# Patient Record
Sex: FEMALE | Race: WHITE | NOT HISPANIC OR LATINO
[De-identification: names, ages, dates, MRNs, and addresses within clinical notes are randomized per-mention and may not be internally consistent; named-entity substitution may affect disease eponyms.]

---

## 2023-10-10 ENCOUNTER — NON-APPOINTMENT (OUTPATIENT)
Age: 73
End: 2023-10-10

## 2023-10-10 PROBLEM — Z00.00 ENCOUNTER FOR PREVENTIVE HEALTH EXAMINATION: Status: ACTIVE | Noted: 2023-10-10

## 2023-10-12 ENCOUNTER — APPOINTMENT (OUTPATIENT)
Dept: BREAST CENTER | Facility: CLINIC | Age: 73
End: 2023-10-12
Payer: MEDICARE

## 2023-10-12 VITALS
DIASTOLIC BLOOD PRESSURE: 74 MMHG | BODY MASS INDEX: 30.32 KG/M2 | SYSTOLIC BLOOD PRESSURE: 156 MMHG | HEIGHT: 65 IN | HEART RATE: 73 BPM | WEIGHT: 182 LBS

## 2023-10-12 DIAGNOSIS — Z80.41 FAMILY HISTORY OF MALIGNANT NEOPLASM OF OVARY: ICD-10-CM

## 2023-10-12 DIAGNOSIS — F17.200 NICOTINE DEPENDENCE, UNSPECIFIED, UNCOMPLICATED: ICD-10-CM

## 2023-10-12 DIAGNOSIS — Z78.9 OTHER SPECIFIED HEALTH STATUS: ICD-10-CM

## 2023-10-12 PROCEDURE — 99204 OFFICE O/P NEW MOD 45 MIN: CPT

## 2023-10-12 RX ORDER — ATORVASTATIN CALCIUM 80 MG/1
TABLET, FILM COATED ORAL
Refills: 0 | Status: ACTIVE | COMMUNITY

## 2023-10-27 ENCOUNTER — OUTPATIENT (OUTPATIENT)
Dept: OUTPATIENT SERVICES | Facility: HOSPITAL | Age: 73
LOS: 1 days | End: 2023-10-27
Payer: MEDICARE

## 2023-10-27 ENCOUNTER — RESULT REVIEW (OUTPATIENT)
Age: 73
End: 2023-10-27

## 2023-10-27 DIAGNOSIS — N60.91 UNSPECIFIED BENIGN MAMMARY DYSPLASIA OF RIGHT BREAST: ICD-10-CM

## 2023-10-27 PROCEDURE — 88321 CONSLTJ&REPRT SLD PREP ELSWR: CPT

## 2023-10-30 LAB
SURGICAL PATHOLOGY STUDY: SIGNIFICANT CHANGE UP

## 2023-11-02 ENCOUNTER — NON-APPOINTMENT (OUTPATIENT)
Age: 73
End: 2023-11-02

## 2023-11-07 ENCOUNTER — NON-APPOINTMENT (OUTPATIENT)
Age: 73
End: 2023-11-07

## 2023-11-08 ENCOUNTER — NON-APPOINTMENT (OUTPATIENT)
Age: 73
End: 2023-11-08

## 2023-11-15 ENCOUNTER — APPOINTMENT (OUTPATIENT)
Age: 73
End: 2023-11-15

## 2023-11-30 ENCOUNTER — APPOINTMENT (OUTPATIENT)
Dept: BREAST CENTER | Facility: CLINIC | Age: 73
End: 2023-11-30

## 2023-12-04 ENCOUNTER — NON-APPOINTMENT (OUTPATIENT)
Age: 73
End: 2023-12-04

## 2023-12-05 ENCOUNTER — NON-APPOINTMENT (OUTPATIENT)
Age: 73
End: 2023-12-05

## 2023-12-11 ENCOUNTER — NON-APPOINTMENT (OUTPATIENT)
Age: 73
End: 2023-12-11

## 2023-12-11 ENCOUNTER — RESULT REVIEW (OUTPATIENT)
Age: 73
End: 2023-12-11

## 2023-12-19 ENCOUNTER — NON-APPOINTMENT (OUTPATIENT)
Age: 73
End: 2023-12-19

## 2023-12-20 ENCOUNTER — NON-APPOINTMENT (OUTPATIENT)
Age: 73
End: 2023-12-20

## 2024-01-18 ENCOUNTER — NON-APPOINTMENT (OUTPATIENT)
Age: 74
End: 2024-01-18

## 2024-01-22 ENCOUNTER — APPOINTMENT (OUTPATIENT)
Dept: BREAST CENTER | Facility: CLINIC | Age: 74
End: 2024-01-22

## 2024-01-31 ENCOUNTER — RESULT REVIEW (OUTPATIENT)
Age: 74
End: 2024-01-31

## 2024-02-02 ENCOUNTER — NON-APPOINTMENT (OUTPATIENT)
Age: 74
End: 2024-02-02

## 2024-02-05 ENCOUNTER — APPOINTMENT (OUTPATIENT)
Dept: BREAST CENTER | Facility: CLINIC | Age: 74
End: 2024-02-05
Payer: MEDICARE

## 2024-02-05 ENCOUNTER — RESULT REVIEW (OUTPATIENT)
Age: 74
End: 2024-02-05

## 2024-02-05 VITALS
HEART RATE: 64 BPM | BODY MASS INDEX: 30.66 KG/M2 | HEIGHT: 65 IN | WEIGHT: 184 LBS | SYSTOLIC BLOOD PRESSURE: 158 MMHG | DIASTOLIC BLOOD PRESSURE: 72 MMHG

## 2024-02-05 DIAGNOSIS — R92.8 OTHER ABNORMAL AND INCONCLUSIVE FINDINGS ON DIAGNOSTIC IMAGING OF BREAST: ICD-10-CM

## 2024-02-05 PROCEDURE — 99215 OFFICE O/P EST HI 40 MIN: CPT

## 2024-02-05 NOTE — PAST MEDICAL HISTORY
[Surgical Menopause] : The patient is in surgical menopause [Total Preg ___] : G[unfilled] [Live Births ___] : P[unfilled]  [Age At Live Birth ___] : Age at live birth: [unfilled] [History of Hormone Replacement Treatment] : has no history of hormone replacement treatment [de-identified] : Hysterectomy [FreeTextEntry6] : No [FreeTextEntry7] : No [FreeTextEntry8] : No

## 2024-02-05 NOTE — PHYSICAL EXAM
[Normocephalic] : normocephalic [EOMI] : extra ocular movement intact [Supple] : supple [Examined in the supine and seated position] : examined in the supine and seated position [No dominant masses] : no dominant masses in right breast  [No dominant masses] : no dominant masses left breast [No Nipple Retraction] : no left nipple retraction [No Nipple Discharge] : no left nipple discharge [No Axillary Lymphadenopathy] : no left axillary lymphadenopathy

## 2024-02-05 NOTE — PLAN
[TextEntry] : Patient to go to the OR on 3/13/24 for right breast needle localization partial mastectomy and sentinel lymph node biopsy Consent signed and scanned to chart Medical clearance needed  Patient to follow-up 7 to 10 days after surgery completed

## 2024-02-05 NOTE — DATA REVIEWED
[FreeTextEntry1] : 10/3/22 (R) screening mammogram: scattered fbg density: Questioned findings in the right breast. Recommend patient submit her outside studies for comparison. Otherwise additional right diagnostic mammography and ultrasound are recommended. BI-RADS 0.  1/23/23 (R) right diag mammo and US: scattered fbg density: 1. Indeterminate right breast calcifications. Stereotactic guided biopsy is recommended. 2. Probably benign right breast nodules. Six-month follow-up targeted right breast ultrasound is recommended. BI-RADS 4.  2/6/23 (R) THE STEREOTACTIC BIOPSY WAS CANCELED. IMPRESSION: "Probably benign" microcalcifications in the upper outer right breast. The stereotactic biopsy was canceled. The patient will try to obtain her prior studies. A follow-up right breast magnification recommended is recommended in 6 months.  8/21/23 (R) right diag mammo & US: scattered fbg density. 1. Right breast, 1:00, 14-20 cm from the nipple (measurement varies with positioning), palpable 1.1 cm solid mass for which ultrasound-guided core biopsies recommended. 2. Probably benign right breast upper-outer quadrant focal asymmetry with stable calcifications and upper inner quadrant focal asymmetry for which diagnostic mammography and targeted sonography, October, 2023 recommended to coincide with patient's annual exam, pending right breast biopsy pathology. BI-RADS 4.  9/25/23 ([facility]) right US bx: Right breast 1 o'clock axis, 20 cm from the nipple. Fragments of a papillary lesion with atypia. A focus of atypical ductal hyperplasia with calcifications. Differential includes papillary carcinoma, atypical intraductal papillary lesion. Excision of the lesion is recommended. High risk and concordant pathology.  12/11/2023 (R) right diagnostic mammogram: Biopsy-proven papilloma containing DCIS and microinvasion is again seen containing a biopsy clip in the upper right breast. In the central right breast there is a circumscribed mass measuring 0.9 cm which corresponds to a mass seen on the previous mammogram in the outer portion of the right breast and has increased in size in comparison to earlier mammograms, this area will be reevaluated under ultrasound for a correlate. A question calcifications is not definitively seen on these views. BI-RADS 0  12/11/2023 (R) right breast ultrasound biopsy 10:00 5 cm FN (wing clip): Benign breast parenchyma with a portion of the cyst wall, pathology is benign, concordant. A 6-month follow-up right targeted US is recommended. Patient is currently scheduled for stereotactic biopsy of indeterminate calcifications in the right breast 12 cm FN described on mammogram on 11/7/2023.  1/12/2024 ([facility]) right breast stereotactic biopsy (clip): - Fibroadenomatoid changes associated with calcifications.- Multiple levels examined, concordance pending

## 2024-02-05 NOTE — ASSESSMENT
[FreeTextEntry1] : 73-year-old female with right breast atypical ductal hyperplasia and papillary lesion suspicious for possible papillary carcinoma with microinvasion

## 2024-02-05 NOTE — FAMILY HISTORY
[TextEntry] : Mother ovarian cancer age 35 Brother colon cancer age 62. Denies any family history of breast, pancreatic, prostate or melanoma.

## 2024-03-12 ENCOUNTER — OUTPATIENT (OUTPATIENT)
Dept: OUTPATIENT SERVICES | Facility: HOSPITAL | Age: 74
LOS: 1 days | End: 2024-03-12
Payer: MEDICARE

## 2024-03-12 ENCOUNTER — APPOINTMENT (OUTPATIENT)
Dept: NUCLEAR MEDICINE | Facility: HOSPITAL | Age: 74
End: 2024-03-12
Payer: MEDICARE

## 2024-03-12 PROCEDURE — 78195 LYMPH SYSTEM IMAGING: CPT | Mod: 26

## 2024-03-12 PROCEDURE — 78195 LYMPH SYSTEM IMAGING: CPT

## 2024-03-12 PROCEDURE — A9541: CPT

## 2024-03-13 ENCOUNTER — TRANSCRIPTION ENCOUNTER (OUTPATIENT)
Age: 74
End: 2024-03-13

## 2024-03-13 ENCOUNTER — APPOINTMENT (OUTPATIENT)
Age: 74
End: 2024-03-13
Payer: MEDICARE

## 2024-03-13 ENCOUNTER — OUTPATIENT (OUTPATIENT)
Dept: OUTPATIENT SERVICES | Facility: HOSPITAL | Age: 74
LOS: 1 days | End: 2024-03-13

## 2024-03-13 ENCOUNTER — APPOINTMENT (OUTPATIENT)
Dept: MAMMOGRAPHY | Facility: CLINIC | Age: 74
End: 2024-03-13
Payer: MEDICARE

## 2024-03-13 ENCOUNTER — RESULT REVIEW (OUTPATIENT)
Age: 74
End: 2024-03-13

## 2024-03-13 ENCOUNTER — APPOINTMENT (OUTPATIENT)
Dept: ULTRASOUND IMAGING | Facility: CLINIC | Age: 74
End: 2024-03-13
Payer: MEDICARE

## 2024-03-13 ENCOUNTER — NON-APPOINTMENT (OUTPATIENT)
Age: 74
End: 2024-03-13

## 2024-03-13 PROCEDURE — 38900 IO MAP OF SENT LYMPH NODE: CPT | Mod: RT

## 2024-03-13 PROCEDURE — 14000 TIS TRNFR TRUNK 10 SQ CM/<: CPT | Mod: RT,59

## 2024-03-13 PROCEDURE — 88360 TUMOR IMMUNOHISTOCHEM/MANUAL: CPT | Mod: 26

## 2024-03-13 PROCEDURE — 88305 TISSUE EXAM BY PATHOLOGIST: CPT | Mod: 26,59

## 2024-03-13 PROCEDURE — 88307 TISSUE EXAM BY PATHOLOGIST: CPT | Mod: 26,59

## 2024-03-13 PROCEDURE — 76098 X-RAY EXAM SURGICAL SPECIMEN: CPT | Mod: 26,77

## 2024-03-13 PROCEDURE — 76098 X-RAY EXAM SURGICAL SPECIMEN: CPT | Mod: 26

## 2024-03-13 PROCEDURE — 19301 PARTIAL MASTECTOMY: CPT | Mod: RT

## 2024-03-13 PROCEDURE — 38525 BIOPSY/REMOVAL LYMPH NODES: CPT | Mod: RT

## 2024-03-13 PROCEDURE — 88307 TISSUE EXAM BY PATHOLOGIST: CPT | Mod: 26

## 2024-03-13 PROCEDURE — 19285 PERQ DEV BREAST 1ST US IMAG: CPT | Mod: RT

## 2024-03-13 PROCEDURE — 88305 TISSUE EXAM BY PATHOLOGIST: CPT | Mod: 26

## 2024-03-15 ENCOUNTER — NON-APPOINTMENT (OUTPATIENT)
Age: 74
End: 2024-03-15

## 2024-03-19 ENCOUNTER — NON-APPOINTMENT (OUTPATIENT)
Age: 74
End: 2024-03-19

## 2024-03-20 ENCOUNTER — NON-APPOINTMENT (OUTPATIENT)
Age: 74
End: 2024-03-20

## 2024-03-20 LAB — SURGICAL PATHOLOGY STUDY: SIGNIFICANT CHANGE UP

## 2024-03-22 ENCOUNTER — NON-APPOINTMENT (OUTPATIENT)
Age: 74
End: 2024-03-22

## 2024-03-25 ENCOUNTER — APPOINTMENT (OUTPATIENT)
Dept: BREAST CENTER | Facility: CLINIC | Age: 74
End: 2024-03-25
Payer: MEDICARE

## 2024-03-25 ENCOUNTER — APPOINTMENT (OUTPATIENT)
Dept: HEMATOLOGY ONCOLOGY | Facility: CLINIC | Age: 74
End: 2024-03-25

## 2024-03-25 VITALS
SYSTOLIC BLOOD PRESSURE: 125 MMHG | HEART RATE: 71 BPM | BODY MASS INDEX: 29.66 KG/M2 | HEIGHT: 65 IN | WEIGHT: 178 LBS | DIASTOLIC BLOOD PRESSURE: 67 MMHG

## 2024-03-25 DIAGNOSIS — N60.91 UNSPECIFIED BENIGN MAMMARY DYSPLASIA OF RIGHT BREAST: ICD-10-CM

## 2024-03-25 PROCEDURE — 99024 POSTOP FOLLOW-UP VISIT: CPT

## 2024-03-25 NOTE — DATA REVIEWED
[FreeTextEntry1] : 10/3/22 (R) screening mammogram: scattered fbg density: Questioned findings in the right breast. Recommend patient submit her outside studies for comparison. Otherwise additional right diagnostic mammography and ultrasound are recommended. BI-RADS 0.  1/23/23 (R) right diag mammo and US: scattered fbg density: 1. Indeterminate right breast calcifications. Stereotactic guided biopsy is recommended. 2. Probably benign right breast nodules. Six-month follow-up targeted right breast ultrasound is recommended. BI-RADS 4.  2/6/23 (R) THE STEREOTACTIC BIOPSY WAS CANCELED. IMPRESSION: "Probably benign" microcalcifications in the upper outer right breast. The stereotactic biopsy was canceled. The patient will try to obtain her prior studies. A follow-up right breast magnification recommended is recommended in 6 months.  8/21/23 (R) right diag mammo & US: scattered fbg density. 1. Right breast, 1:00, 14-20 cm from the nipple (measurement varies with positioning), palpable 1.1 cm solid mass for which ultrasound-guided core biopsies recommended. 2. Probably benign right breast upper-outer quadrant focal asymmetry with stable calcifications and upper inner quadrant focal asymmetry for which diagnostic mammography and targeted sonography, October, 2023 recommended to coincide with patient's annual exam, pending right breast biopsy pathology. BI-RADS 4.  9/25/23 ([facility]) right US bx: Right breast 1 o'clock axis, 20 cm from the nipple. Fragments of a papillary lesion with atypia. A focus of atypical ductal hyperplasia with calcifications. Differential includes papillary carcinoma, atypical intraductal papillary lesion. Excision of the lesion is recommended. High risk and concordant pathology.  12/11/2023 (R) right diagnostic mammogram: Biopsy-proven papilloma containing DCIS and microinvasion is again seen containing a biopsy clip in the upper right breast. In the central right breast there is a circumscribed mass measuring 0.9 cm which corresponds to a mass seen on the previous mammogram in the outer portion of the right breast and has increased in size in comparison to earlier mammograms, this area will be reevaluated under ultrasound for a correlate. A question calcifications is not definitively seen on these views. BI-RADS 0  12/11/2023 (R) right breast ultrasound biopsy 10:00 5 cm FN (wing clip): Benign breast parenchyma with a portion of the cyst wall, pathology is benign, concordant. A 6-month follow-up right targeted US is recommended. Patient is currently scheduled for stereotactic biopsy of indeterminate calcifications in the right breast 12 cm FN described on mammogram on 11/7/2023.  1/12/2024 ([facility]) right breast stereotactic biopsy (clip): - Fibroadenomatoid changes associated with calcifications.- Multiple levels examined, concordance pending   3/13/2024 (L  pathology) right breast partial mastectomy, right SLNB: 1  Right breast 1 oclock lumpectomy: Ductal carcinoma in situ, intermediate grade, solid and cribriform types, involving intraductal papilloma, present on 5contiguous sections (20 mm), present at the medial margin and biopsy site changes. Fibrocystic changes associated calcifications. 2  Right breast additional anterior margin: Benign fibroadipose tissue and skeletal muscle, negative for tumor. 3  Right breast additional posterior margin: Benign fibroadipose tissue and skeletal muscle, negative for tumor. 4  Right breast additional inferior margin:  Lobular carcinoma in situ. Intraductal papilloma. Lobular carcinoma in situ.  Fibrocystic changes. 5  Right breast additional superior margin: Benign breast tissue and skeletal muscle. 6  Right breast additional lateral margin:  Benign fibrofatty breast, negative for tumor. 7  Right breast additional medial margin:  Focal atypical duct hyperplasia associated calcifications.  Lobular carcinoma in situ.  Intraductal papilloma. Fibrocystic changes. Benign skeletal muscle. 8  Right axillary sentinel lymph nodes: One lymph node, negative for tumor (0/1).

## 2024-03-25 NOTE — HISTORY OF PRESENT ILLNESS
[FreeTextEntry1] : 72 yo female presents for postoperative evaluation of right 1:00 20cmFN DCIS with a focus of microinvasion (IHC markers unknown) status post right breast localized partial mastectomy, right SLNB on 3/13/2024, final surgical pathology revealed DCIS measuring 2.0 cm, LCIS, focal ADH, 0/1 lymph node negative for tumor.  TNM stage: pTispN0.  Patient is currently complaining of 10/10 pain.  On physical exam, the patient was noted to have a significant right axillary seroma.  The seroma was drained in the office and the patient tolerated it well. Surgical pathology returned as seen below. I explained the results to the patient and provided them with a copy of the report.  She understands that she will have to follow-up with medical oncology as well as radiation oncology for discussion of adjuvant treatment.  Genetic counseling was performed today and the patient declined testing at this time.  The patient was given a cancer care package.  Cancer history: Patient was diagnosed with right microinvasive DCIS in 2023. 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 Patient is status post right breast localized partial mastectomy, final surgical pathology revealed DCIS measuring 2.0 cm, LCIS, focal ADH, 0/1 lymph node negative for tumor.  TNM stage: pTispN0  Prior to patient's diagnosis, her VERENICE lifetime risk was 20.7%; family history of ovarian cancer mother diagnosed at age 35, denies family history of breast cancer.  Patient is a current smoker, states she smokes about 4 cigarettes daily.

## 2024-03-25 NOTE — ASSESSMENT
[FreeTextEntry1] : 73-year-old female status post right partial mastectomy and sentinel lymph node biopsy on 3/13/2024 for DCIS, LCIS, focal ADH

## 2024-03-25 NOTE — DISCUSSION/SUMMARY
[FreeTextEntry1] : REASON FOR CONSULT Emily Romero is a 73-year-old female who was referred by Dr. Sabiha Carter for cancer genetic counseling and risk assessment due to a recent breast cancer diagnosis.  RELEVANT MEDICAL HISTORY Ms. Romero was diagnosed with right breast cancer in  at 73 years old. Review of outside pathology report of right breast biopsy in 2023 revealed ductal carcinoma in situ. Outside pathology report stated fragments of a papillary lesion with atypia and a focus of atypical ductal hyperplasia with calcifications. Surgical pathology report from right lumpectomy revealed right ductal carcinoma in situ, unknown biomarkers, and right focal atypical ductal hyperplasia associated with calcifications, lobular carcinoma in situ, intraductal papilloma, fibrocystic changes associated with calcifications, benign fibrofatty breast tissue, and benign fibroadipose tissue. She was treated with a right lumpectomy.   OTHER MEDICAL AND SURGICAL HISTORY: -	Medical History: asthma -	Surgical History: hysterectomy and bilateral salpingo-oophorectomy (45 years old; due to fibroids), hernia repair, right lumpectomy  PAST OB/GYN HISTORY: Obstetrical History:  Age at Menarche: 16 Menopausal with LMP at age 45  Age at First Live Birth: 18 Oral Contraceptive Use: No Hormone Replacement Therapy: No  CANCER SCREENING HISTORY:   Breast:  -	Mammography: 2023-Bilateral, predominant circumscribed mass with coarse calcifications and a lobulated mass previously described as an asymmetry were identified in the right breast as well as a circumscribed, lobulated mass in the left breast. Known biopsy-proven papillary lesion with atypia was identified in the right breast without significant change. Addendum to report issued after review of outside pathology and upgrade to DCIS stating right stereotactic and ultrasound-guided biopsies were recommended. 2023-Right, biopsy-proven DCIS was identified as well as a circumscribed mass which has increased in size in the right breast. Ultrasound was recommended. -	Sonography: 2023-Bilateral, known papillary lesion with atypia was identified without significant changes in the right breast as well as a circumscribed mass in the right breast. A circumscribed, lobulated mass was identified in the left breast. Addendum to report issued after review of outside pathology and upgrade to DCIS stating right stereotactic and ultrasound-guided biopsies were recommended.  -	MRI: No -	Biopsies: 2024-Right, fibroadenomatoid changes associated with calcifications; 2023-Right, benign breast parenchyma with a portion of cyst wall; 10/2023-Right, DCIS, focus suspicious for microinvasion; 2023-Right, outside report revealed fragments of a papillary lesion with atypia, focus of ADH with calcifications  GYN: -	Pelvic Examination: last , reportedly normal  -	Sonography: No -	CA-125: No Colon: -	Colonoscopy: last 7 years ago, reportedly normal. Patient reports approximately 10 colorectal polyps identified on previous colonoscopies. -	Upper Endoscopy: last 7 years ago, reportedly normal  Skin:   -	FBSE: No -	Lesions biopsied/removed: No  SOCIAL HISTORY: -	Tobacco-product use: Yes, current (4 cigarettes/day)  FAMILY HISTORY: Maternal ancestry and paternal ancestry were reported as [place]. Ashkenazi Religion ancestry and consanguinity were denied. A detailed family history of cancer was ascertained. Relevant diagnoses are detailed below and in the scanned pedigree.   To Ms. Romero's knowledge, no one in the family has had germline testing for cancer susceptibility.   	 	RISK ASSESSMENT: Ms. Romero's personal history of breast cancer and colorectal polyps and/or family history of ovarian cancer is suggestive of an inherited predisposition to breast cancer and/or ovarian cancer and/or colorectal polyps and related cancers. We recommended genetic testing for genes associated with breast cancer, gynecological cancers, and colorectal cancer.  We discussed the risks, benefits and limitations, and implications of genetic testing. We also discussed the psychosocial implications of genetic testing. Possible test results were reviewed with Ms. Romero, along with associated medical management options.   Of note, Ms. Romero declined genetic testing today and stated that she is not interested in genetic testing at this time. She was made aware that we remain available to her should she have additional questions or would like to proceed with genetic testing in the future.   PLAN:  1.	Ms. Romero declined genetic testing today. She will recontact Cancer Genetics if she has additional questions or would like to pursue genetic testing in the future.   For any additional questions please call [facility] at [phone].    Norma Coffman MS, [facility] Genetic Counselor, [facility]   CC:  Dr. Sabiha Carter

## 2024-03-25 NOTE — PHYSICAL EXAM
[Normocephalic] : normocephalic [EOMI] : extra ocular movement intact [Examined in the supine and seated position] : examined in the supine and seated position [No dominant masses] : no dominant masses in right breast  [Supple] : supple [No dominant masses] : no dominant masses left breast [No Nipple Retraction] : no left nipple retraction [No Nipple Discharge] : no left nipple discharge [No Axillary Lymphadenopathy] : no left axillary lymphadenopathy [No Supraclavicular Adenopathy] : no supraclavicular adenopathy [de-identified] : Well-healed lumpectomy incision, well-healed axillary incision, significant axillary seroma drained in the office today.  No signs of active infection.

## 2024-03-25 NOTE — PLAN
[TextEntry] : Referral to medical oncology for adjuvant treatment Referral to radiation oncology for adjuvant treatment Patient to perform self breast exams as instructed in the office Patient to follow-up regarding right axillary seroma Patient to follow-up in 3 months

## 2024-03-25 NOTE — FAMILY HISTORY
[TextEntry] : Mother ovarian cancer age 35 Brother colon cancer age 62. Denies any family history of breast, pancreatic, prostate or melanoma.   BRSS now 6. less tachypnea, lung sounds improved. Xray with no focal consolidation.   drank 4 ounces.  if remains well appearing will d/c home with STRICT return precautions.

## 2024-03-25 NOTE — PAST MEDICAL HISTORY
[Surgical Menopause] : The patient is in surgical menopause [Total Preg ___] : G[unfilled] [Live Births ___] : P[unfilled]  [Age At Live Birth ___] : Age at live birth: [unfilled] [de-identified] : Hysterectomy [History of Hormone Replacement Treatment] : has no history of hormone replacement treatment [FreeTextEntry6] : No [FreeTextEntry7] : No [FreeTextEntry8] : No

## 2024-03-29 ENCOUNTER — APPOINTMENT (OUTPATIENT)
Dept: BREAST CENTER | Facility: CLINIC | Age: 74
End: 2024-03-29
Payer: MEDICARE

## 2024-03-29 ENCOUNTER — APPOINTMENT (OUTPATIENT)
Dept: HEMATOLOGY ONCOLOGY | Facility: CLINIC | Age: 74
End: 2024-03-29
Payer: MEDICARE

## 2024-03-29 DIAGNOSIS — Z86.000 PERSONAL HISTORY OF IN-SITU NEOPLASM OF BREAST: ICD-10-CM

## 2024-03-29 PROCEDURE — 99204 OFFICE O/P NEW MOD 45 MIN: CPT

## 2024-03-29 PROCEDURE — 99213 OFFICE O/P EST LOW 20 MIN: CPT

## 2024-03-29 RX ORDER — TAMOXIFEN CITRATE 20 MG/1
20 TABLET, FILM COATED ORAL DAILY
Qty: 90 | Refills: 3 | Status: ACTIVE | COMMUNITY
Start: 2024-03-29 | End: 1900-01-01

## 2024-03-29 NOTE — HISTORY OF PRESENT ILLNESS
[FreeTextEntry1] : 74 yo female presents for 2nd postoperative evaluation for right axillary seroma. Patient has right 1:00 20cmFN DCIS with a focus of microinvasion,  ER 95%, NC 98%, status post right breast localized partial mastectomy, right SLNB with Dr. Carter on 3/13/2024, final surgical pathology revealed DCIS measuring 2.0 cm, LCIS, focal ADH, 0/1 lymph node negative for tumor.  TNM stage: pTispN0.  Patient was seen for initial postop on 3/25/2024 at which time a right axillary seroma was noted, she is status post I&D on 3/25/2024.  The patient was started on Keflex 500 mg p.o. 4 times daily x 7 days, she has been compliant with taking.  Patient is pending a medical oncology consult with Dr. Carolina today.  She is pending a radiation oncology consult with Dr. Byers, date for consult is pending nurse navigator is assisting to coordinate this.  Genetic counseling was performed on 3/25/2024 and the patient declined testing at this time.   Cancer history: Patient was diagnosed with right microinvasive DCIS in 2023. 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 Patient is status post right breast localized partial mastectomy, final surgical pathology revealed DCIS measuring 2.0 cm, LCIS, focal ADH, 0/1 lymph node negative for tumor.  TNM stage: pTispN0  Prior to patient's diagnosis, her VERENICE lifetime risk was 20.7%; family history of ovarian cancer mother diagnosed at age 35, denies family history of breast cancer.  Patient is a current smoker, states she smokes about 4 cigarettes daily.

## 2024-03-29 NOTE — PHYSICAL EXAM
[Normocephalic] : normocephalic [No Supraclavicular Adenopathy] : no supraclavicular adenopathy [No dominant masses] : no dominant masses in right breast  [Examined in the supine and seated position] : examined in the supine and seated position [No dominant masses] : no dominant masses left breast [No Nipple Retraction] : no left nipple retraction [No Nipple Discharge] : no left nipple discharge [No Axillary Lymphadenopathy] : no left axillary lymphadenopathy [No Swelling] : no swelling [No Edema] : no edema [No Rashes] : no rashes [No Ulceration] : no ulceration [de-identified] : Well-healed lumpectomy incision, well-healed axillary incision with proximal aspect of incision open s/p I&D draining serous fluid, slight erythema noted to distal end of incision

## 2024-03-29 NOTE — REVIEW OF SYSTEMS
[Abdominal Pain] : abdominal pain [Diarrhea: Grade 0] : Diarrhea: Grade 0 [Negative] : Allergic/Immunologic

## 2024-03-29 NOTE — ASSESSMENT
[FreeTextEntry1] : 73-year-old female, with right DCIS ER/NE positive status post right partial mastectomy and sentinel lymph node biopsy on 3/13/2024, with right axillary seroma status post I&D on 3/25/2024, on Keflex  Reviewed clinical breast exam findings with the patient with right axillary seroma.  Discussed with the patient there are no active signs of infection, reviewed the mild erythema noted to the distal aspect of the incision, emphasized the importance with the patient to continue compliance with Keflex as prescribed to complete the 7-day course.  Emphasized the importance to wear compression bra and compression dressing to the right axillary region, apply ice as needed.  Reviewed signs and symptoms of when to notify the office to return sooner; otherwise, patient will return in 2 weeks for a reevaluation.  Of note the patient states that the [address] [place] [address] location is very hard to travel to and requests all future visits to be at [address] [place] [address].  Reviewed the indication to meet with medical oncology to discuss adjuvant therapy as well as radiation oncologist to discuss indication for XRT.  Patient has a consult with Dr. Carolina (medical oncology) today.  The patient is currently being managed by the nurse navigator for care coordination and referral to Dr. Byers (radiation oncology)

## 2024-03-29 NOTE — PLAN
[TextEntry] : Continue wearing compression dressing as instructed Consult with medical oncology as scheduled Consulted radiation oncology Complete course of Keflex as prescribed Follow-up in 2 weeks for clinical reevaluation

## 2024-03-29 NOTE — PAST MEDICAL HISTORY
[Surgical Menopause] : The patient is in surgical menopause [Total Preg ___] : G[unfilled] [Live Births ___] : P[unfilled]  [Age At Live Birth ___] : Age at live birth: [unfilled] [History of Hormone Replacement Treatment] : has no history of hormone replacement treatment [de-identified] : Hysterectomy [FreeTextEntry6] : No [FreeTextEntry7] : No [FreeTextEntry8] : No

## 2024-04-01 NOTE — HISTORY OF PRESENT ILLNESS
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  TNM stage: pTispN0.  Patient is currently complaining of 10/10 pain.  Cancer history: Patient was diagnosed with right microinvasive DCIS in 2023. 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 Patient is status post right breast localized partial mastectomy, final surgical pathology revealed DCIS measuring 2.0 cm, LCIS, focal ADH, 0/1 lymph node negative for tumor.  TNM stage: pTispN0  Prior to patient's diagnosis, her VERENICE lifetime risk was 20.7%; family history of ovarian cancer mother diagnosed at age 35, denies family history of breast cancer.  Patient is a current smoker, states she smokes about 4 cigarettes daily. 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 She is referred by Dr. Carter.  Fhx: ovarian cancer mother diagnosed at age 35, denies family history of breast cancer.  Patient is a current smoker, states she smokes about 4 cigarettes/day  10/3/22 (R) screening mammogram: scattered fbg density: Questioned findings in the right breast. Recommend patient submit her outside studies for comparison. Otherwise additional right diagnostic mammography and ultrasound are recommended. BI-RADS 0.  1/23/23 (R) right diag mammo and US: scattered fbg density: 1. Indeterminate right breast calcifications. Stereotactic guided biopsy is recommended. 2. Probably benign right breast nodules. Six-month follow-up targeted right breast ultrasound is recommended. BI-RADS 4.  2/6/23 (R) THE STEREOTACTIC BIOPSY WAS CANCELED. IMPRESSION: "Probably benign" microcalcifications in the upper outer right breast. The stereotactic biopsy was canceled. The patient will try to obtain her prior studies. A follow-up right breast magnification recommended is recommended in 6 months.  8/21/23 (R) right diag mammo & US: scattered fbg density. 1. Right breast, 1:00, 14-20 cm from the nipple (measurement varies with positioning), palpable 1.1 cm solid mass for which ultrasound-guided core biopsies recommended. 2. Probably benign right breast upper-outer quadrant focal asymmetry with stable calcifications and upper inner quadrant focal asymmetry for which diagnostic mammography and targeted sonography, October, 2023 recommended to coincide with patient's annual exam, pending right breast biopsy pathology. BI-RADS 4.  9/25/23 ([facility]) right US bx: Right breast 1 o'clock axis, 20 cm from the nipple. Fragments of a papillary lesion with atypia. A focus of atypical ductal hyperplasia with calcifications. Differential includes papillary carcinoma, atypical intraductal papillary lesion. Excision of the lesion is recommended. High risk and concordant pathology. ([facility] consultation report: Ductal carcinoma in situ low to intermediate grade, cribriform type, associated calcifications, involving intraductal papilloma. A focus suspicious for microinvasion is also identified.)   12/11/2023 (R) right diagnostic mammogram: Biopsy-proven papilloma containing DCIS and microinvasion is again seen containing a biopsy clip in the upper right breast. In the central right breast there is a circumscribed mass measuring 0.9 cm which corresponds to a mass seen on the previous mammogram in the outer portion of the right breast and has increased in size in comparison to earlier mammograms, this area will be reevaluated under ultrasound for a correlate. The questioned calcifications is not definitively seen on these views. BI-RADS 0  12/11/2023 (R) right breast ultrasound biopsy 10:00 5 cm FN (wing clip): Benign breast parenchyma with a portion of the cyst wall, pathology is benign, concordant. A 6-month follow-up right targeted US is recommended. Patient is currently scheduled for stereotactic biopsy of indeterminate calcifications in the right breast 12 cm FN described on mammogram on 11/7/2023.  1/12/2024 (R) right breast stereotactic biopsy (clip): - Fibroadenomatoid changes associated with calcifications.  3/13/2024 ([facility]) right breast partial mastectomy, right SLNB: 1 Right breast 1 oclock lumpectomy: Ductal carcinoma in situ, intermediate grade, solid and cribriform types, involving intraductal papilloma, present on 5 contiguous sections (20 mm), present at the medial margin and biopsy site changes. Fibrocystic changes associated calcifications. ER 95%, LA 98% 2 Right breast additional anterior margin: Benign fibroadipose tissue and skeletal muscle, negative for tumor. 3 Right breast additional posterior margin: Benign fibroadipose tissue and skeletal muscle, negative for tumor. 4 Right breast additional inferior margin: Lobular carcinoma in situ. Intraductal papilloma. Lobular carcinoma in situ. Fibrocystic changes. 5 Right breast additional superior margin: Benign breast tissue and skeletal muscle. 6 Right breast additional lateral margin: Benign fibrofatty breast, negative for tumor. 7 Right breast additional medial margin: Focal atypical duct hyperplasia associated calcifications. Lobular carcinoma in situ. Intraductal papilloma. Fibrocystic changes. Benign skeletal muscle. 8 Right axillary sentinel lymph nodes: One lymph node, negative for tumor (0/1).  [de-identified] : Pt with body aches/chronic back ache, denies change, denies weight loss. Feels well otherwise.  Endorses being told to have thin bones, unsure about osteoporosis. Denies h/o VTE, stroke, MI.

## 2024-04-01 NOTE — PAST MEDICAL HISTORY
[History of Hormone Replacement Treatment] : has no history of hormone replacement treatment [de-identified] : Hysterectomy [FreeTextEntry7] : No [FreeTextEntry6] : No [FreeTextEntry8] : No

## 2024-04-01 NOTE — ASSESSMENT
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 She is referred by Dr. Carter.  pT1mi pN0 Stage IA (focus of microinvasion w/o biomarkers, DCIS ER/HI+)  Reviewed surgical pathology with patient in detail recommending adjuvant antiestrogen therapy with Tamoxifen 20mg/day x5yrs after reviewing its benefits in lowering risk of a recurrence and its risks incl menopausal symptoms such as hot flashes, as well as small risks of VTE, cataracts and endometrial ca (but pt s/p hysterectomy done for fibroids yrs ago as per pt). Reviewed that there is another agent, Arimidex, an aromatase inhibitor which we could use as well but that it too is associated with menopausal side effects, is as effective as Tamoxifen but has a risk of lowering bone density which in Mrs Romero may be a significant problem due to her reporting a history of "thin bones".   DEXA to establish baseline bone density. Recommended vitamin D3/Ca daily.  Reviewed that patient will need to see a radiation oncologist for consultation for adj RT.  Also discussed that surveillance breast imaging is recommended and will be ordered by breast surgeon during the follow up visits with her.  Discussed other age appropriate cancer screening such as a colonoscopy (pt declined referral at the moment wishing to focus on management of breast ca) and offered smoking cessation counseling (pt declining any aids in helping her quit).  RTC in 2-3 months for follow up.

## 2024-04-02 ENCOUNTER — NON-APPOINTMENT (OUTPATIENT)
Age: 74
End: 2024-04-02

## 2024-04-03 ENCOUNTER — NON-APPOINTMENT (OUTPATIENT)
Age: 74
End: 2024-04-03

## 2024-04-05 ENCOUNTER — APPOINTMENT (OUTPATIENT)
Dept: RADIATION ONCOLOGY | Facility: CLINIC | Age: 74
End: 2024-04-05
Payer: MEDICARE

## 2024-04-05 VITALS
TEMPERATURE: 98.2 F | DIASTOLIC BLOOD PRESSURE: 69 MMHG | HEART RATE: 71 BPM | BODY MASS INDEX: 31.32 KG/M2 | WEIGHT: 188.2 LBS | SYSTOLIC BLOOD PRESSURE: 139 MMHG | OXYGEN SATURATION: 96 %

## 2024-04-05 DIAGNOSIS — I10 ESSENTIAL (PRIMARY) HYPERTENSION: ICD-10-CM

## 2024-04-05 PROCEDURE — 99205 OFFICE O/P NEW HI 60 MIN: CPT

## 2024-04-05 RX ORDER — LISINOPRIL 30 MG/1
TABLET ORAL
Refills: 0 | Status: ACTIVE | COMMUNITY

## 2024-04-10 ENCOUNTER — NON-APPOINTMENT (OUTPATIENT)
Age: 74
End: 2024-04-10

## 2024-04-10 NOTE — HISTORY OF PRESENT ILLNESS
[FreeTextEntry1] : Emily Romero is a 72 yo F with AJCC 8th Ed Stage IA ([id number]) RIGHT UIQ Breast ER+/MA+ breast IDC s/p 3/13/24 right breast partial mastectomy, right SLNB (20 mm, grade II, margins negative, distance from DCIS to closest margin greater than 10mm, 0/1 nodes positive) who is referred by Dr. Carter for discussion of radiation to the Right breast.    4/5/24: Consultation	  Mrs. Romero presents for discussion of adjuvant radiation to the Right breast. No prior h/o radiation, PPM/ICD, or connective tissue disorder. She lives alone with one child living nearby and reports she has adequate support. She is independent in ADLs. She has concerns regarding adjuvant treatment as her  was a cancer patient and she witnessed the effect of treatment on him.   History of Present Illness  ____________________________________  Emily Romero is a 72 yo F, current smoker, with h/o HTN, and HLD, who was found to have Right breast DCIS on abnormal imaging.    10/3/22 ([facility]) screening mammogram: scattered fbg density: Questioned findings in the right breast. Recommend patient submit her outside studies for comparison. Otherwise additional right diagnostic mammography and ultrasound are recommended. BI-RADS 0.   1/23/23 ([facility]) right diag mammo and US: scattered fbg density: 1. Indeterminate right breast calcifications. Stereotactic guided biopsy is recommended. 2. Probably benign right breast nodules. Six-month follow-up targeted right breast ultrasound is recommended. BI-RADS 4.   2/6/23 ([facility]) THE STEREOTACTIC BIOPSY WAS CANCELED. IMPRESSION:  "Probably benign" microcalcifications in the upper outer right breast. The stereotactic biopsy was canceled. The patient will try to obtain her prior studies. A follow-up right breast magnification recommended is recommended in 6 months.   8/21/23 ([facility]) right diag mammo & US: scattered fbg density. 1. Right breast, 1:00, 14-20 cm from the nipple (measurement varies with positioning), palpable 1.1 cm solid mass for which ultrasound-guided core biopsies recommended.  2. Probably benign right breast upper-outer quadrant focal asymmetry with stable calcifications and upper inner quadrant focal asymmetry for which diagnostic mammography and targeted sonography, October, 2023 recommended to coincide with patient's annual exam, pending right breast biopsy pathology. BI-RADS 4.   9/25/23 ([facility]) right US bx: Right breast 1 o'clock axis, 20 cm from the nipple. Fragments of a papillary lesion with atypia. A focus of atypical ductal hyperplasia with calcifications. Differential includes papillary carcinoma, atypical intraductal papillary lesion. Excision of the lesion is recommended. High risk and concordant pathology.   12/11/2023 ([facility]) right diagnostic mammogram: Biopsy-proven papilloma containing DCIS and microinvasion is again seen containing a biopsy clip in the upper right breast. In the central right breast there is a circumscribed mass measuring 0.9 cm which corresponds to a mass seen on the previous mammogram in the outer portion of the right breast and has increased in size in comparison to earlier mammograms, this area will be reevaluated under ultrasound for a correlate. A question calcifications is not definitively seen on these views. BI-RADS 0   12/11/2023 ([facility]) right breast ultrasound biopsy 10:00 5 cm FN (wing clip): Benign breast parenchyma with a portion of the cyst wall, pathology is benign, concordant. A 6-month follow-up right targeted US is recommended. Patient is currently scheduled for stereotactic biopsy of indeterminate calcifications in the right breast 12 cm FN described on mammogram on 11/7/2023.   1/12/2024 ([facility]) right breast stereotactic biopsy (clip): - Fibroadenomatoid changes associated with calcifications.- Multiple levels examined, concordance pending   1/31/24: Right breast bx Breast, right, biopsy: - Fibroadenomatoid changes associated with calcifications. - Multiple levels examined.  3/13/2024 ([facility]) right breast partial mastectomy, right SLNB: 1 Right breast 1 oclock lumpectomy: Ductal carcinoma in situ, intermediate grade, solid and cribriform types, involving intraductal papilloma, present on 5contiguous sections (20 mm), present at the medial margin and biopsy site changes. Fibrocystic changes associated calcifications.  2 Right breast additional anterior margin: Benign fibroadipose tissue and skeletal muscle, negative for tumor.  3 Right breast additional posterior margin: Benign fibroadipose tissue and skeletal muscle, negative for tumor.  4 Right breast additional inferior margin: Lobular carcinoma in situ. Intraductal papilloma. Lobular carcinoma in situ. Fibrocystic changes.  5 Right breast additional superior margin: Benign breast tissue and skeletal muscle.  6 Right breast additional lateral margin: Benign fibrofatty breast, negative for tumor.  7 Right breast additional medial margin: Focal atypical duct hyperplasia associated calcifications. Lobular carcinoma in situ. Intraductal papilloma. Fibrocystic changes. Benign skeletal muscle.  8 Right axillary sentinel lymph nodes: One lymph node, negative for tumor (0/1).  RESULTS OF ER and MA IMMUNOHISTOCHEMICAL ANALYSIS [facility] Pathology Block ID #:  [id number] ESTROGEN RECEPTOR: POSITIVE 95% NUCLEAR STAINING WITH STRONG 3+ INTENSITY PROGESTERONE RECEPTOR: POSITIVE  98% NUCLEAR STAINING WITH STRONG 3+ INTENSITY

## 2024-04-10 NOTE — PHYSICAL EXAM
[Supraclavicular Lymph Nodes Enlarged Bilaterally] : supraclavicular [Axillary Lymph Nodes Enlarged Bilaterally] : axillary [Normal] : oriented to person, place and time, the affect was normal, the mood was normal and not anxious [de-identified] : Healing surgical incisions. Palpable seroma under R arm with mild erythema.

## 2024-04-10 NOTE — PAST MEDICAL HISTORY
[Surgical Menopause] : The patient is in surgical menopause [Total Preg ___] : G[unfilled] [Live Births ___] : P[unfilled]  [Age At Live Birth ___] : Age at live birth: [unfilled] [History of Hormone Replacement Treatment] : has no history of hormone replacement treatment [de-identified] : Hysterectomy [FreeTextEntry6] : No [FreeTextEntry7] : No [FreeTextEntry8] : No

## 2024-04-10 NOTE — REVIEW OF SYSTEMS
[Negative] : Endocrine [Fatigue: Grade 0] : Fatigue: Grade 0 [Breast Pain: Grade 0] : Breast Pain: Grade 0 [de-identified] : Seroma Right axilla

## 2024-04-10 NOTE — VITALS
[Date: ____________] : Patient's last distress assessment performed on [unfilled]. [0 - No Distress] : Distress Level: 0 [Maximal Pain Intensity: 0/10] : 0/10 [Least Pain Intensity: 0/10] : 0/10 [90: Able to carry normal activity; minor signs or symptoms of disease.] : 90: Able to carry normal activity; minor signs or symptoms of disease.  [ECOG Performance Status: 1 - Restricted in physically strenuous activity but ambulatory and able to carry out work of a light or sedentary nature] : Performance Status: 1 - Restricted in physically strenuous activity but ambulatory and able to carry out work of a light or sedentary nature, e.g., light house work, office work

## 2024-04-11 ENCOUNTER — NON-APPOINTMENT (OUTPATIENT)
Age: 74
End: 2024-04-11

## 2024-04-15 ENCOUNTER — NON-APPOINTMENT (OUTPATIENT)
Age: 74
End: 2024-04-15

## 2024-04-16 ENCOUNTER — NON-APPOINTMENT (OUTPATIENT)
Age: 74
End: 2024-04-16

## 2024-04-19 ENCOUNTER — APPOINTMENT (OUTPATIENT)
Dept: RADIOLOGY | Facility: HOSPITAL | Age: 74
End: 2024-04-19

## 2024-04-19 ENCOUNTER — APPOINTMENT (OUTPATIENT)
Dept: BREAST CENTER | Facility: CLINIC | Age: 74
End: 2024-04-19

## 2024-04-19 ENCOUNTER — NON-APPOINTMENT (OUTPATIENT)
Age: 74
End: 2024-04-19

## 2024-04-21 ENCOUNTER — NON-APPOINTMENT (OUTPATIENT)
Age: 74
End: 2024-04-21

## 2024-04-22 ENCOUNTER — NON-APPOINTMENT (OUTPATIENT)
Age: 74
End: 2024-04-22

## 2024-04-22 ENCOUNTER — APPOINTMENT (OUTPATIENT)
Dept: BREAST CENTER | Facility: CLINIC | Age: 74
End: 2024-04-22

## 2024-04-23 ENCOUNTER — APPOINTMENT (OUTPATIENT)
Dept: BREAST CENTER | Facility: CLINIC | Age: 74
End: 2024-04-23
Payer: MEDICARE

## 2024-04-23 ENCOUNTER — NON-APPOINTMENT (OUTPATIENT)
Age: 74
End: 2024-04-23

## 2024-04-23 DIAGNOSIS — N64.89 OTHER SPECIFIED DISORDERS OF BREAST: ICD-10-CM

## 2024-04-23 PROCEDURE — 99024 POSTOP FOLLOW-UP VISIT: CPT

## 2024-04-23 NOTE — ASSESSMENT
[FreeTextEntry1] : 73-year-old female status post right partial mastectomy and sentinel lymph node biopsy on 3/13/2024 for DCIS, LCIS, focal ADH.

## 2024-04-23 NOTE — PHYSICAL EXAM
[Normocephalic] : normocephalic [No Supraclavicular Adenopathy] : no supraclavicular adenopathy [Examined in the supine and seated position] : examined in the supine and seated position [No dominant masses] : no dominant masses in right breast  [No dominant masses] : no dominant masses left breast [No Nipple Retraction] : no left nipple retraction [No Nipple Discharge] : no left nipple discharge [No Axillary Lymphadenopathy] : no left axillary lymphadenopathy [No Edema] : no edema [No Swelling] : no swelling [No Rashes] : no rashes [No Ulceration] : no ulceration [de-identified] : Well-healed lumpectomy incision, well-healed axillary incision with proximal aspect of incision open s/p I&D draining serous fluid, slight erythema noted to distal end of incision

## 2024-04-23 NOTE — PLAN
[TextEntry] : Patient to continue to follow-up with medical oncology as well as radiation oncology Cleared to proceed with CT simulation Patient perform self breast exams as instructed in the office Patient to follow-up in 3 months

## 2024-04-23 NOTE — PAST MEDICAL HISTORY
[Surgical Menopause] : The patient is in surgical menopause [Total Preg ___] : G[unfilled] [Live Births ___] : P[unfilled]  [Age At Live Birth ___] : Age at live birth: [unfilled] [History of Hormone Replacement Treatment] : has no history of hormone replacement treatment [de-identified] : Hysterectomy [FreeTextEntry6] : No [FreeTextEntry7] : No [FreeTextEntry8] : No

## 2024-04-23 NOTE — HISTORY OF PRESENT ILLNESS
[FreeTextEntry1] : 72 yo female presents for follow up postoperative evaluation for right axillary seroma. 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 Carter on 3/13/2024, final surgical pathology revealed DCIS measuring 2.0 cm, LCIS, focal ADH, 0/1 lymph node negative for tumor. TNM stage: pTispN0. Patient was seen for initial postop on 3/25/2024 at which time a right axillary seroma was noted, she is status post I&D on 3/25/2024. The patient was started on Keflex 500 mg p.o. 4 times daily x 7 days, she has been compliant with taking. Patient had a consult with Dr. Carolina (medical oncology) on 3/29/2024, adjuvant antiestrogen therapy was discussed with the patient. Patient underwent consult with Dr. Byers (radiation oncology) on 4/5/2024, where XRT was recommended, patient is pending a follow-up for CT simulation following resolution of her inflamed axillary seroma.  The seroma appears to have been resolved.  There are no signs of active infection.  She is cleared to proceed with CT simulation with radiation oncology.  Genetic counseling was performed on 3/25/2024 and the patient declined testing at this time.  Cancer history: Patient was diagnosed with right microinvasive DCIS in 2023. 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 Patient is status post right breast localized partial mastectomy, final surgical pathology revealed DCIS measuring 2.0 cm, LCIS, focal ADH, 0/1 lymph node negative for tumor. TNM stage: pTispN0  Prior to patient's diagnosis, her VERENICE lifetime risk was 20.7%; family history of ovarian cancer mother diagnosed at age 35, denies family history of breast cancer.  Patient is a current smoker, states she smokes about 4 cigarettes daily.

## 2024-05-07 ENCOUNTER — NON-APPOINTMENT (OUTPATIENT)
Age: 74
End: 2024-05-07

## 2024-05-22 ENCOUNTER — NON-APPOINTMENT (OUTPATIENT)
Age: 74
End: 2024-05-22

## 2024-05-22 NOTE — PHYSICAL EXAM
[Normal] : oriented to person, place and time, the affect was normal, the mood was normal and not anxious [de-identified] : R CW incision CDI, R armpit incision CDI no open area noted.

## 2024-05-22 NOTE — HISTORY OF PRESENT ILLNESS
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 Carter for discussion of radiation to the Right breast.    5/22/2024 - OTV Patient has received 1560/2600cGy of radiation to the R breast. She comes today endorsing some liquid drainage (clear, non offensive, small amount) from Right armpit incision - no open area on incision noted. States MD drained it in the past. She states her R CW incision is tender to touch. She offers no other complaints. She denies fatigue, Pain, sob/pedro pablo/cough, cp. Patient to continue radiation as planned.    4/5/24: Consultation	  Mrs. Romero presents for discussion of adjuvant radiation to the Right breast. No prior h/o radiation, PPM/ICD, or connective tissue disorder. She lives alone with one child living nearby and reports she has adequate support. She is independent in ADLs. She has concerns regarding adjuvant treatment as her  was a cancer patient and she witnessed the effect of treatment on him.   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    10/3/22 ([facility]) screening mammogram: scattered fbg density: Questioned findings in the right breast. Recommend patient submit her outside studies for comparison. Otherwise additional right diagnostic mammography and ultrasound are recommended. BI-RADS 0.   1/23/23 (R) right diag mammo and US: scattered fbg density: 1. Indeterminate right breast calcifications. Stereotactic guided biopsy is recommended. 2. Probably benign right breast nodules. Six-month follow-up targeted right breast ultrasound is recommended. BI-RADS 4.   2/6/23 (R) THE STEREOTACTIC BIOPSY WAS CANCELED. IMPRESSION:  "Probably benign" microcalcifications in the upper outer right breast. The stereotactic biopsy was canceled. The patient will try to obtain her prior studies. A follow-up right breast magnification recommended is recommended in 6 months.   8/21/23 (R) right diag mammo & US: scattered fbg density. 1. Right breast, 1:00, 14-20 cm from the nipple (measurement varies with positioning), palpable 1.1 cm solid mass for which ultrasound-guided core biopsies recommended.  2. Probably benign right breast upper-outer quadrant focal asymmetry with stable calcifications and upper inner quadrant focal asymmetry for which diagnostic mammography and targeted sonography, October, 2023 recommended to coincide with patient's annual exam, pending right breast biopsy pathology. BI-RADS 4.   9/25/23 ([facility]) right US bx: Right breast 1 o'clock axis, 20 cm from the nipple. Fragments of a papillary lesion with atypia. A focus of atypical ductal hyperplasia with calcifications. Differential includes papillary carcinoma, atypical intraductal papillary lesion. Excision of the lesion is recommended. High risk and concordant pathology.   12/11/2023 (R) right diagnostic mammogram: Biopsy-proven papilloma containing DCIS and microinvasion is again seen containing a biopsy clip in the upper right breast. In the central right breast there is a circumscribed mass measuring 0.9 cm which corresponds to a mass seen on the previous mammogram in the outer portion of the right breast and has increased in size in comparison to earlier mammograms, this area will be reevaluated under ultrasound for a correlate. A question calcifications is not definitively seen on these views. BI-RADS 0   12/11/2023 (R) right breast ultrasound biopsy 10:00 5 cm FN (wing clip): Benign breast parenchyma with a portion of the cyst wall, pathology is benign, concordant. A 6-month follow-up right targeted US is recommended. Patient is currently scheduled for stereotactic biopsy of indeterminate calcifications in the right breast 12 cm FN described on mammogram on 11/7/2023.   1/12/2024 (R) right breast stereotactic biopsy (clip): - Fibroadenomatoid changes associated with calcifications.- Multiple levels examined, concordance pending   1/31/24: Right breast bx Breast, right, biopsy: - Fibroadenomatoid changes associated with calcifications. - Multiple levels examined.  3/13/2024 ([facility] pathology) right breast partial mastectomy, right SLNB: 1 Right breast 1 oclock lumpectomy: Ductal carcinoma in situ, intermediate grade, solid and cribriform types, involving intraductal papilloma, present on 5contiguous sections (20 mm), present at the medial margin and biopsy site changes. Fibrocystic changes associated calcifications.  2 Right breast additional anterior margin: Benign fibroadipose tissue and skeletal muscle, negative for tumor.  3 Right breast additional posterior margin: Benign fibroadipose tissue and skeletal muscle, negative for tumor.  4 Right breast additional inferior margin: Lobular carcinoma in situ. Intraductal papilloma. Lobular carcinoma in situ. Fibrocystic changes.  5 Right breast additional superior margin: Benign breast tissue and skeletal muscle.  6 Right breast additional lateral margin: Benign fibrofatty breast, negative for tumor.  7 Right breast additional medial margin: Focal atypical duct hyperplasia associated calcifications. Lobular carcinoma in situ. Intraductal papilloma. Fibrocystic changes. Benign skeletal muscle.  8 Right axillary sentinel lymph nodes: One lymph node, negative for tumor (0/1).  RESULTS OF ER and OH IMMUNOHISTOCHEMICAL ANALYSIS [facility] Pathology Block ID #:  [id number] ESTROGEN RECEPTOR: POSITIVE 95% NUCLEAR STAINING WITH STRONG 3+ INTENSITY PROGESTERONE RECEPTOR: POSITIVE  98% NUCLEAR STAINING WITH STRONG 3+ INTENSITY

## 2024-05-22 NOTE — DISEASE MANAGEMENT
[Pathological] : TNM Stage: p [TTNM] : 1mi [NTNM] : 0 [MTNM] : X [IA] : IA [de-identified] : 6958cGy [de-identified] : 260cTj

## 2024-05-22 NOTE — VITALS
[Maximal Pain Intensity: 0/10] : 0/10 [NoTreatment Scheduled] : no treatment scheduled [90: Able to carry normal activity; minor signs or symptoms of disease.] : 90: Able to carry normal activity; minor signs or symptoms of disease.  [90: Minor restrictions in physically strenous activity.] : 90: Minor restrictions in physically strenuous activity. [ECOG Performance Status: 1 - Restricted in physically strenuous activity but ambulatory and able to carry out work of a light or sedentary nature] : Performance Status: 1 - Restricted in physically strenuous activity but ambulatory and able to carry out work of a light or sedentary nature, e.g., light house work, office work

## 2024-05-22 NOTE — REVIEW OF SYSTEMS
[Constipation: Grade 0] : Constipation: Grade 0 [Diarrhea: Grade 0] : Diarrhea: Grade 0 [Nausea: Grade 0] : Nausea: Grade 0 [Vomiting: Grade 0] : Vomiting: Grade 0 [Fatigue: Grade 0] : Fatigue: Grade 0 [Cognitive Disturbance: Grade 0] : Cognitive Disturbance: Grade 0 [Concentration Impairment: Grade 0] : Concentration Impairment: Grade 0 [Headache: Grade 0] : Headache: Grade 0 [Anxiety: Grade 0] : Anxiety: Grade 0  [Depression: Grade 0] : Depression: Grade 0 [Cough: Grade 0] : Cough: Grade 0 [Dyspnea: Grade 0] : Dyspnea: Grade 0 [Dermatitis Radiation: Grade 0] : Dermatitis Radiation: Grade 0

## 2024-05-31 VITALS — SYSTOLIC BLOOD PRESSURE: 124 MMHG | DIASTOLIC BLOOD PRESSURE: 84 MMHG

## 2024-06-18 ENCOUNTER — NON-APPOINTMENT (OUTPATIENT)
Age: 74
End: 2024-06-18

## 2024-06-21 ENCOUNTER — APPOINTMENT (OUTPATIENT)
Dept: HEMATOLOGY ONCOLOGY | Facility: CLINIC | Age: 74
End: 2024-06-21
Payer: MEDICARE

## 2024-06-21 VITALS
HEIGHT: 65 IN | OXYGEN SATURATION: 95 % | TEMPERATURE: 99.2 F | BODY MASS INDEX: 30.32 KG/M2 | WEIGHT: 182 LBS | RESPIRATION RATE: 18 BRPM | HEART RATE: 59 BPM | DIASTOLIC BLOOD PRESSURE: 74 MMHG | SYSTOLIC BLOOD PRESSURE: 143 MMHG

## 2024-06-21 DIAGNOSIS — Z79.811 LONG TERM (CURRENT) USE OF AROMATASE INHIBITORS: ICD-10-CM

## 2024-06-21 DIAGNOSIS — D05.11 INTRADUCTAL CARCINOMA IN SITU OF RIGHT BREAST: ICD-10-CM

## 2024-06-21 DIAGNOSIS — Z98.890 OTHER SPECIFIED POSTPROCEDURAL STATES: ICD-10-CM

## 2024-06-21 PROCEDURE — 99213 OFFICE O/P EST LOW 20 MIN: CPT

## 2024-06-21 RX ORDER — ACETAMINOPHEN AND CODEINE PHOSPHATE 300; 30 MG/1; MG/1
300-30 TABLET ORAL
Qty: 5 | Refills: 0 | Status: DISCONTINUED | COMMUNITY
Start: 2024-03-13 | End: 2024-06-21

## 2024-06-21 RX ORDER — ERGOCALCIFEROL 1.25 MG/1
50000 CAPSULE ORAL
Refills: 0 | Status: ACTIVE | COMMUNITY
Start: 2024-06-21

## 2024-06-21 RX ORDER — CEPHALEXIN 500 MG/1
500 CAPSULE ORAL 4 TIMES DAILY
Qty: 28 | Refills: 0 | Status: DISCONTINUED | COMMUNITY
Start: 2024-03-25 | End: 2024-06-21

## 2024-06-21 NOTE — HISTORY OF PRESENT ILLNESS
[de-identified] : 72 yo female presents for initial evaluation of right 1:00 20cmFN DCIS with a focus of microinvasion (IHC markers unknown) status post right breast localized partial mastectomy, right SLNB on 3/13/2024, final surgical pathology revealed DCIS ER/VA+ measuring 2.0 cm, LCIS, focal ADH, 0/1 lymph node negative for tumor. She is referred by Dr. Carter. Here today for f/u.   Fhx: ovarian cancer mother diagnosed at age 35, denies family history of breast cancer.  Patient is a current smoker, states she smokes about 4 cigarettes/day  10/3/22 (R) screening mammogram: scattered fbg density: Questioned findings in the right breast. Recommend patient submit her outside studies for comparison. Otherwise additional right diagnostic mammography and ultrasound are recommended. BI-RADS 0.  1/23/23 (R) right diag mammo and US: scattered fbg density: 1. Indeterminate right breast calcifications. Stereotactic guided biopsy is recommended. 2. Probably benign right breast nodules. Six-month follow-up targeted right breast ultrasound is recommended. BI-RADS 4.  2/6/23 (R) THE STEREOTACTIC BIOPSY WAS CANCELED. IMPRESSION: "Probably benign" microcalcifications in the upper outer right breast. The stereotactic biopsy was canceled. The patient will try to obtain her prior studies. A follow-up right breast magnification recommended is recommended in 6 months.  8/21/23 (R) right diag mammo & US: scattered fbg density. 1. Right breast, 1:00, 14-20 cm from the nipple (measurement varies with positioning), palpable 1.1 cm solid mass for which ultrasound-guided core biopsies recommended. 2. Probably benign right breast upper-outer quadrant focal asymmetry with stable calcifications and upper inner quadrant focal asymmetry for which diagnostic mammography and targeted sonography, October, 2023 recommended to coincide with patient's annual exam, pending right breast biopsy pathology. BI-RADS 4.  9/25/23 ([facility]) right US bx: Right breast 1 o'clock axis, 20 cm from the nipple. Fragments of a papillary lesion with atypia. A focus of atypical ductal hyperplasia with calcifications. Differential includes papillary carcinoma, atypical intraductal papillary lesion. Excision of the lesion is recommended. High risk and concordant pathology. ([facility] consultation report: Ductal carcinoma in situ low to intermediate grade, cribriform type, associated calcifications, involving intraductal papilloma. A focus suspicious for microinvasion is also identified.)   12/11/2023 (R) right diagnostic mammogram: Biopsy-proven papilloma containing DCIS and microinvasion is again seen containing a biopsy clip in the upper right breast. In the central right breast there is a circumscribed mass measuring 0.9 cm which corresponds to a mass seen on the previous mammogram in the outer portion of the right breast and has increased in size in comparison to earlier mammograms, this area will be reevaluated under ultrasound for a correlate. The questioned calcifications is not definitively seen on these views. BI-RADS 0  12/11/2023 (R) right breast ultrasound biopsy 10:00 5 cm FN (wing clip): Benign breast parenchyma with a portion of the cyst wall, pathology is benign, concordant. A 6-month follow-up right targeted US is recommended. Patient is currently scheduled for stereotactic biopsy of indeterminate calcifications in the right breast 12 cm FN described on mammogram on 11/7/2023.  1/12/2024 (R) right breast stereotactic biopsy (clip): - Fibroadenomatoid changes associated with calcifications.  3/13/2024 ([facility] pathology) right breast partial mastectomy, right SLNB: 1 Right breast 1 oclock lumpectomy: Ductal carcinoma in situ, intermediate grade, solid and cribriform types, involving intraductal papilloma, present on 5 contiguous sections (20 mm), present at the medial margin and biopsy site changes. Fibrocystic changes associated calcifications. ER 95%, VA 98% 2 Right breast additional anterior margin: Benign fibroadipose tissue and skeletal muscle, negative for tumor. 3 Right breast additional posterior margin: Benign fibroadipose tissue and skeletal muscle, negative for tumor. 4 Right breast additional inferior margin: Lobular carcinoma in situ. Intraductal papilloma. Lobular carcinoma in situ. Fibrocystic changes. 5 Right breast additional superior margin: Benign breast tissue and skeletal muscle. 6 Right breast additional lateral margin: Benign fibrofatty breast, negative for tumor. 7 Right breast additional medial margin: Focal atypical duct hyperplasia associated calcifications. Lobular carcinoma in situ. Intraductal papilloma. Fibrocystic changes. Benign skeletal muscle. 8 Right axillary sentinel lymph nodes: One lymph node, negative for tumor (0/1).  [de-identified] : Feels well overall. Finished RT 5/31. Denies starting tamoxifen.  Denies any new meds.  Endorses smoking 1-2 cigarettes /day, states is not ready to quit completely.

## 2024-06-21 NOTE — PHYSICAL EXAM
[Ambulatory and capable of all self care but unable to carry out any work activities] : Status 2- Ambulatory and capable of all self care but unable to carry out any work activities. Up and about more than 50% of waking hours [Normal] : affect appropriate [de-identified] : no palpable lesions in either breast nor axilla

## 2024-06-21 NOTE — ASSESSMENT
[FreeTextEntry1] : 74 yo woman, current cigarette smoker, with h/o right 1:00 20cmFN DCIS with a focus of microinvasion (IHC markers unknown) status post right breast localized partial mastectomy, right SLNB on 3/13/2024, final surgical pathology revealed DCIS ER/WV+ measuring 2.0 cm, LCIS, focal ADH, 0/1 lymph node negative for tumor. She is referred by Dr. Carter.  pT1mi pN0 Stage IA (focus of microinvasion w/o biomarkers, DCIS ER/WV+)  Reviewed surgical pathology with patient in detail recommending adjuvant antiestrogen therapy with Tamoxifen 20mg/day x5yrs after reviewing its benefits in lowering risk of a recurrence and its risks incl menopausal symptoms such as hot flashes, as well as small risks of VTE, cataracts and endometrial ca (but pt s/p hysterectomy done for fibroids yrs ago as per pt). Reviewed that there is another agent, Arimidex, an aromatase inhibitor which we could use as well but that it too is associated with menopausal side effects, is as effective as Tamoxifen but has a risk of lowering bone density which in Mrs Romero may be a significant problem due to her reporting a history of "thin bones".   Pt completed adj RT in 5/2024. Has not started Tamoxifen yet.  Asked to start Tamoxifen 20mg/day and asked to schedule baseline DEXA scan (ordered at prior visit).  Recommended vitamin D3/Ca daily.   Also discussed that surveillance breast imaging is recommended and will be ordered by breast surgeon during the follow up visits with her.  Discussed other age appropriate cancer screening such as a colonoscopy (pt declined referral at the moment wishing to focus on management of breast ca) and offered smoking cessation counseling (pt declining any aids in helping her quit).  RTC in 2-3 months for follow up. Reviewed that I will be leaving practice and that she can continue her care with Dr Arellano going forward at [facility].

## 2024-06-21 NOTE — PAST MEDICAL HISTORY
[Surgical Menopause] : The patient is in surgical menopause [Total Preg ___] : G[unfilled] [Live Births ___] : P[unfilled]  [Age At Live Birth ___] : Age at live birth: [unfilled] [History of Hormone Replacement Treatment] : has no history of hormone replacement treatment [de-identified] : Hysterectomy [FreeTextEntry6] : No [FreeTextEntry7] : No [FreeTextEntry8] : No

## 2024-06-26 ENCOUNTER — NON-APPOINTMENT (OUTPATIENT)
Age: 74
End: 2024-06-26

## 2024-06-26 ENCOUNTER — APPOINTMENT (OUTPATIENT)
Dept: RADIOLOGY | Facility: HOSPITAL | Age: 74
End: 2024-06-26

## 2024-07-16 ENCOUNTER — NON-APPOINTMENT (OUTPATIENT)
Age: 74
End: 2024-07-16

## 2024-07-18 ENCOUNTER — NON-APPOINTMENT (OUTPATIENT)
Age: 74
End: 2024-07-18

## 2024-07-19 ENCOUNTER — APPOINTMENT (OUTPATIENT)
Dept: RADIATION ONCOLOGY | Facility: CLINIC | Age: 74
End: 2024-07-19

## 2024-07-19 ENCOUNTER — NON-APPOINTMENT (OUTPATIENT)
Age: 74
End: 2024-07-19

## 2024-07-19 VITALS
DIASTOLIC BLOOD PRESSURE: 64 MMHG | BODY MASS INDEX: 29.82 KG/M2 | HEART RATE: 55 BPM | RESPIRATION RATE: 18 BRPM | TEMPERATURE: 97.4 F | SYSTOLIC BLOOD PRESSURE: 136 MMHG | HEIGHT: 65 IN | OXYGEN SATURATION: 96 % | WEIGHT: 179 LBS

## 2024-08-12 ENCOUNTER — NON-APPOINTMENT (OUTPATIENT)
Age: 74
End: 2024-08-12

## 2024-08-13 ENCOUNTER — APPOINTMENT (OUTPATIENT)
Dept: BREAST CENTER | Facility: CLINIC | Age: 74
End: 2024-08-13
Payer: MEDICARE

## 2024-08-13 VITALS — WEIGHT: 185 LBS | HEIGHT: 65 IN | BODY MASS INDEX: 30.82 KG/M2

## 2024-08-13 DIAGNOSIS — Z98.890 OTHER SPECIFIED POSTPROCEDURAL STATES: ICD-10-CM

## 2024-08-13 DIAGNOSIS — D05.11 INTRADUCTAL CARCINOMA IN SITU OF RIGHT BREAST: ICD-10-CM

## 2024-08-13 DIAGNOSIS — Z85.3 PERSONAL HISTORY OF MALIGNANT NEOPLASM OF BREAST: ICD-10-CM

## 2024-08-13 PROCEDURE — 99213 OFFICE O/P EST LOW 20 MIN: CPT

## 2024-08-13 NOTE — PAST MEDICAL HISTORY
[Surgical Menopause] : The patient is in surgical menopause [Total Preg ___] : G[unfilled] [Live Births ___] : P[unfilled]  [Age At Live Birth ___] : Age at live birth: [unfilled] [History of Hormone Replacement Treatment] : has no history of hormone replacement treatment [de-identified] : Hysterectomy [FreeTextEntry6] : No [FreeTextEntry7] : No [FreeTextEntry8] : No

## 2024-08-13 NOTE — DATA REVIEWED
[FreeTextEntry1] : 10/3/22 (R) screening mammogram: scattered fbg density: Questioned findings in the right breast. Recommend patient submit her outside studies for comparison. Otherwise additional right diagnostic mammography and ultrasound are recommended. BI-RADS 0.  1/23/23 (R) right diag mammo and US: scattered fbg density: 1. Indeterminate right breast calcifications. Stereotactic guided biopsy is recommended. 2. Probably benign right breast nodules. Six-month follow-up targeted right breast ultrasound is recommended. BI-RADS 4.  2/6/23 (R) THE STEREOTACTIC BIOPSY WAS CANCELED. IMPRESSION: "Probably benign" microcalcifications in the upper outer right breast. The stereotactic biopsy was canceled. The patient will try to obtain her prior studies. A follow-up right breast magnification recommended is recommended in 6 months.  8/21/23 (R) right diag mammo & US: scattered fbg density. 1. Right breast, 1:00, 14-20 cm from the nipple (measurement varies with positioning), palpable 1.1 cm solid mass for which ultrasound-guided core biopsies recommended. 2. Probably benign right breast upper-outer quadrant focal asymmetry with stable calcifications and upper inner quadrant focal asymmetry for which diagnostic mammography and targeted sonography, October, 2023 recommended to coincide with patient's annual exam, pending right breast biopsy pathology. BI-RADS 4.  9/25/23 ([facility]) right US bx: Right breast 1 o'clock axis, 20 cm from the nipple. Fragments of a papillary lesion with atypia. A focus of atypical ductal hyperplasia with calcifications. Differential includes papillary carcinoma, atypical intraductal papillary lesion. Excision of the lesion is recommended. High risk and concordant pathology.  11/7/23 (R) b/l dx mammogram & US: Scattered areas of fibroglandular density, known papillary lesion with atypia in the far posterior superior right breast, surgical excision recommended.  No significant interval change in probably benign superior lateral and superior medial right breast masses with probable correlates demonstrated on ultrasound, 6-month follow-up mammographic and sonographic follow-up are recommended.  BI-RADS 4  12/11/2023 (R) right diagnostic mammogram: Biopsy-proven papilloma containing DCIS and microinvasion is again seen containing a biopsy clip in the upper right breast. In the central right breast there is a circumscribed mass measuring 0.9 cm which corresponds to a mass seen on the previous mammogram in the outer portion of the right breast and has increased in size in comparison to earlier mammograms, this area will be reevaluated under ultrasound for a correlate. A question calcifications is not definitively seen on these views. BI-RADS 0  12/11/2023 ([facility]) right breast ultrasound biopsy 10:00 5 cm FN (wing clip): Benign breast parenchyma with a portion of the cyst wall, pathology is benign, concordant. A 6-month follow-up right targeted US is recommended. Patient is currently scheduled for stereotactic biopsy of indeterminate calcifications in the right breast 12 cm FN described on mammogram on 11/7/2023.  1/12/2024 ([facility]) right breast stereotactic biopsy (clip): - Fibroadenomatoid changes associated with calcifications.- Multiple levels examined, concordant, 6 month follow up recommended   3/13/2024 ([facility]  pathology) right breast partial mastectomy, right SLNB: 1  Right breast 1 oclock lumpectomy: Ductal carcinoma in situ, intermediate grade, solid and cribriform types, involving intraductal papilloma, present on 5contiguous sections (20 mm), present at the medial margin and biopsy site changes. Fibrocystic changes associated calcifications. 2  Right breast additional anterior margin: Benign fibroadipose tissue and skeletal muscle, negative for tumor. 3  Right breast additional posterior margin: Benign fibroadipose tissue and skeletal muscle, negative for tumor. 4  Right breast additional inferior margin:  Lobular carcinoma in situ. Intraductal papilloma. Lobular carcinoma in situ.  Fibrocystic changes. 5  Right breast additional superior margin: Benign breast tissue and skeletal muscle. 6  Right breast additional lateral margin:  Benign fibrofatty breast, negative for tumor. 7  Right breast additional medial margin:  Focal atypical duct hyperplasia associated calcifications.  Lobular carcinoma in situ.  Intraductal papilloma. Fibrocystic changes. Benign skeletal muscle. 8  Right axillary sentinel lymph nodes: One lymph node, negative for tumor (0/1).

## 2024-08-13 NOTE — PHYSICAL EXAM
[Normocephalic] : normocephalic [No Supraclavicular Adenopathy] : no supraclavicular adenopathy [Examined in the supine and seated position] : examined in the supine and seated position [No dominant masses] : no dominant masses in right breast  [No dominant masses] : no dominant masses left breast [No Nipple Retraction] : no left nipple retraction [No Nipple Discharge] : no left nipple discharge [No Axillary Lymphadenopathy] : no left axillary lymphadenopathy [No Edema] : no edema [No Swelling] : no swelling [No Rashes] : no rashes [No Ulceration] : no ulceration [Supple] : supple [de-identified] : Well-healed lumpectomy incision, well-healed axillary incision

## 2024-08-13 NOTE — HISTORY OF PRESENT ILLNESS
[FreeTextEntry1] : 72 yo female presents for follow up of right 1:00 20cmFN DCIS with a focus of microinvasion, ER 95%, SC 98%, status post right breast localized partial mastectomy, right SLNB on 3/13/2024, final surgical pathology revealed DCIS measuring 2.0 cm, LCIS, focal ADH, 0/1 lymph node negative for tumor. TNM stage: pTispN0. Patient is status post XRT with Dr. Byers completed on 6/26/2024. The patient has started taking tamoxifen, tolerating it well. Patient was previously under the medical oncology care of Dr. Carolina, she has a follow-up visit scheduled with Dr. Arellano on 9/9/2024.  The patient is otherwise doing very well.  I recommended that we proceed with annual breast imaging due in November of this year.  The patient is amenable.  I also readdressed genetic counseling and the patient is now interested.  We will arrange genetic counseling to be done in person at the time of the next visit.  Genetic counseling was performed on 3/25/2024 and the patient declined testing at this time.  Cancer history: Disease: right microinvasive DCIS 2024 Tumor Markers: ER/SC + Surgical Interventions: right partial mastectomy, SLNB 2024 SERM/AI: Tamoxifen  XRT: Completed 6/2024 Genetics: Declined  Patient was diagnosed with right microinvasive DCIS in 2023. Right 1:00 20cmFN DCIS with a focus of microinvasion (IHC markers unknown) associated with calcifications and involving an intraductal papilloma found on US as a 1.1 cm mass in the right breast 1:00 14 to 20 cm FN patient is S/P US biopsy on 9/25/2023 biopsy pathology revealed fragments of a papillary lesion with atypia and a focus of atypical ductal hyperplasia with calcifications this pathology underwent slide review in our [facility] pathology department of which upon outside slide review diagnosis of ductal carcinoma in situ low to intermediate grade with a focus suspicious for microinvasion was confirmed. Patient is status post right breast localized partial mastectomy, final surgical pathology revealed DCIS measuring 2.0 cm, LCIS, focal ADH, 0/1 lymph node negative for tumor. TNM stage: pTispN0. Patient was seen for initial postop on 3/25/2024 at which time a right axillary seroma was noted, she is status post I&D on 3/25/2024 s/p course of Keflex with resolution.   Prior to patient's diagnosis, her VERENICE lifetime risk was 20.7%; family history of ovarian cancer mother diagnosed at age 35, denies family history of breast cancer.  Patient is a current smoker, states she smokes about 4 cigarettes daily.

## 2024-08-13 NOTE — ASSESSMENT
[FreeTextEntry1] : 73-year-old female with a personal history of right partial mastectomy and sentinel lymph node biopsy on 3/13/2024 for DCIS, LCIS, focal ADH and family history of ovarian cancer

## 2024-08-13 NOTE — PLAN
[TextEntry] : Bilateral diagnostic mammogram and sonogram due in November 2024 Patient to continue to follow-up with medical oncology as well as radiation oncology Genetic counseling referral for possible genetic testing Patient to perform self-breast exams as instructed in the office. Patient to follow-up in November 2024 after imaging completed.

## 2024-08-20 ENCOUNTER — APPOINTMENT (OUTPATIENT)
Dept: PHYSICAL MEDICINE AND REHAB | Facility: CLINIC | Age: 74
End: 2024-08-20

## 2024-09-06 NOTE — PAST MEDICAL HISTORY
[Surgical Menopause] : The patient is in surgical menopause [Total Preg ___] : G[unfilled] [Live Births ___] : P[unfilled]  [Age At Live Birth ___] : Age at live birth: [unfilled]

## 2024-09-09 ENCOUNTER — APPOINTMENT (OUTPATIENT)
Dept: HEMATOLOGY ONCOLOGY | Facility: CLINIC | Age: 74
End: 2024-09-09

## 2024-09-11 ENCOUNTER — NON-APPOINTMENT (OUTPATIENT)
Age: 74
End: 2024-09-11

## 2024-11-18 ENCOUNTER — NON-APPOINTMENT (OUTPATIENT)
Age: 74
End: 2024-11-18

## 2024-11-19 ENCOUNTER — NON-APPOINTMENT (OUTPATIENT)
Age: 74
End: 2024-11-19

## 2024-11-25 ENCOUNTER — APPOINTMENT (OUTPATIENT)
Dept: BREAST CENTER | Facility: CLINIC | Age: 74
End: 2024-11-25
Payer: MEDICARE

## 2024-11-25 VITALS
HEIGHT: 65 IN | DIASTOLIC BLOOD PRESSURE: 81 MMHG | WEIGHT: 189 LBS | BODY MASS INDEX: 31.49 KG/M2 | SYSTOLIC BLOOD PRESSURE: 149 MMHG | HEART RATE: 55 BPM

## 2024-11-25 DIAGNOSIS — N60.91 UNSPECIFIED BENIGN MAMMARY DYSPLASIA OF RIGHT BREAST: ICD-10-CM

## 2024-11-25 DIAGNOSIS — Z79.811 LONG TERM (CURRENT) USE OF AROMATASE INHIBITORS: ICD-10-CM

## 2024-11-25 DIAGNOSIS — Z98.890 OTHER SPECIFIED POSTPROCEDURAL STATES: ICD-10-CM

## 2024-11-25 DIAGNOSIS — Z86.000 PERSONAL HISTORY OF IN-SITU NEOPLASM OF BREAST: ICD-10-CM

## 2024-11-25 DIAGNOSIS — Z85.3 PERSONAL HISTORY OF MALIGNANT NEOPLASM OF BREAST: ICD-10-CM

## 2024-11-25 PROCEDURE — 99214 OFFICE O/P EST MOD 30 MIN: CPT

## 2024-12-13 ENCOUNTER — APPOINTMENT (OUTPATIENT)
Dept: PHYSICAL MEDICINE AND REHAB | Facility: CLINIC | Age: 74
End: 2024-12-13
Payer: MEDICARE

## 2024-12-13 VITALS
HEART RATE: 71 BPM | SYSTOLIC BLOOD PRESSURE: 154 MMHG | WEIGHT: 189 LBS | BODY MASS INDEX: 31.49 KG/M2 | HEIGHT: 65 IN | OXYGEN SATURATION: 96 % | TEMPERATURE: 99.3 F | DIASTOLIC BLOOD PRESSURE: 82 MMHG

## 2024-12-13 DIAGNOSIS — M25.50 PAIN IN UNSPECIFIED JOINT: ICD-10-CM

## 2024-12-13 PROCEDURE — 99205 OFFICE O/P NEW HI 60 MIN: CPT

## 2024-12-13 RX ORDER — CELECOXIB 400 MG/1
400 CAPSULE ORAL DAILY
Qty: 14 | Refills: 0 | Status: ACTIVE | COMMUNITY
Start: 2024-12-13 | End: 1900-01-01

## 2024-12-30 ENCOUNTER — RX RENEWAL (OUTPATIENT)
Age: 74
End: 2024-12-30

## 2025-02-04 ENCOUNTER — APPOINTMENT (OUTPATIENT)
Dept: PHYSICAL MEDICINE AND REHAB | Facility: CLINIC | Age: 75
End: 2025-02-04

## 2025-03-03 ENCOUNTER — APPOINTMENT (OUTPATIENT)
Dept: HEMATOLOGY ONCOLOGY | Facility: CLINIC | Age: 75
End: 2025-03-03

## 2025-03-04 ENCOUNTER — APPOINTMENT (OUTPATIENT)
Dept: PULMONOLOGY | Facility: CLINIC | Age: 75
End: 2025-03-04